# Patient Record
Sex: FEMALE | Race: WHITE | URBAN - METROPOLITAN AREA
[De-identification: names, ages, dates, MRNs, and addresses within clinical notes are randomized per-mention and may not be internally consistent; named-entity substitution may affect disease eponyms.]

---

## 2023-12-13 ENCOUNTER — ANESTHESIA EVENT (OUTPATIENT)
Dept: SURGERY | Facility: HOSPITAL | Age: 48
End: 2023-12-13
Payer: COMMERCIAL

## 2023-12-13 ENCOUNTER — HOSPITAL ENCOUNTER (OUTPATIENT)
Facility: HOSPITAL | Age: 48
Setting detail: HOSPITAL OUTPATIENT SURGERY
Discharge: HOME | End: 2023-12-13
Attending: OTOLARYNGOLOGY | Admitting: OTOLARYNGOLOGY
Payer: COMMERCIAL

## 2023-12-13 ENCOUNTER — ANESTHESIA (OUTPATIENT)
Dept: SURGERY | Facility: HOSPITAL | Age: 48
End: 2023-12-13
Payer: COMMERCIAL

## 2023-12-13 VITALS
BODY MASS INDEX: 18.75 KG/M2 | SYSTOLIC BLOOD PRESSURE: 144 MMHG | WEIGHT: 109.81 LBS | OXYGEN SATURATION: 85 % | HEART RATE: 79 BPM | DIASTOLIC BLOOD PRESSURE: 89 MMHG | HEIGHT: 64 IN | TEMPERATURE: 97 F | RESPIRATION RATE: 18 BRPM

## 2023-12-13 LAB — B-HCG UR QL: NEGATIVE

## 2023-12-13 PROCEDURE — 88333 PATH CONSLTJ SURG CYTO XM 1: CPT | Performed by: OTOLARYNGOLOGY

## 2023-12-13 PROCEDURE — 88185 FLOWCYTOMETRY/TC ADD-ON: CPT | Performed by: OTOLARYNGOLOGY

## 2023-12-13 PROCEDURE — 88342 IMHCHEM/IMCYTCHM 1ST ANTB: CPT | Performed by: OTOLARYNGOLOGY

## 2023-12-13 PROCEDURE — 88184 FLOWCYTOMETRY/ TC 1 MARKER: CPT | Performed by: OTOLARYNGOLOGY

## 2023-12-13 PROCEDURE — 87206 SMEAR FLUORESCENT/ACID STAI: CPT | Performed by: ANESTHESIOLOGY

## 2023-12-13 PROCEDURE — 87116 MYCOBACTERIA CULTURE: CPT | Performed by: ANESTHESIOLOGY

## 2023-12-13 PROCEDURE — 87102 FUNGUS ISOLATION CULTURE: CPT | Performed by: ANESTHESIOLOGY

## 2023-12-13 PROCEDURE — 81025 URINE PREGNANCY TEST: CPT

## 2023-12-13 PROCEDURE — 88341 IMHCHEM/IMCYTCHM EA ADD ANTB: CPT | Performed by: OTOLARYNGOLOGY

## 2023-12-13 PROCEDURE — 88307 TISSUE EXAM BY PATHOLOGIST: CPT | Performed by: OTOLARYNGOLOGY

## 2023-12-13 PROCEDURE — 0CBM8ZX EXCISION OF PHARYNX, VIA NATURAL OR ARTIFICIAL OPENING ENDOSCOPIC, DIAGNOSTIC: ICD-10-PCS | Performed by: OTOLARYNGOLOGY

## 2023-12-13 RX ORDER — SCOLOPAMINE TRANSDERMAL SYSTEM 1 MG/1
1 PATCH, EXTENDED RELEASE TRANSDERMAL ONCE
Status: DISCONTINUED | OUTPATIENT
Start: 2023-12-13 | End: 2023-12-13 | Stop reason: HOSPADM

## 2023-12-13 RX ORDER — HYDROMORPHONE HYDROCHLORIDE 1 MG/ML
0.2 INJECTION, SOLUTION INTRAMUSCULAR; INTRAVENOUS; SUBCUTANEOUS EVERY 5 MIN PRN
Status: DISCONTINUED | OUTPATIENT
Start: 2023-12-13 | End: 2023-12-13

## 2023-12-13 RX ORDER — SODIUM CHLORIDE, SODIUM LACTATE, POTASSIUM CHLORIDE, CALCIUM CHLORIDE 600; 310; 30; 20 MG/100ML; MG/100ML; MG/100ML; MG/100ML
INJECTION, SOLUTION INTRAVENOUS CONTINUOUS
Status: DISCONTINUED | OUTPATIENT
Start: 2023-12-13 | End: 2023-12-13

## 2023-12-13 RX ORDER — ACETAMINOPHEN 500 MG
1000 TABLET ORAL ONCE AS NEEDED
Status: DISCONTINUED | OUTPATIENT
Start: 2023-12-13 | End: 2023-12-13

## 2023-12-13 RX ORDER — LABETALOL HYDROCHLORIDE 5 MG/ML
5 INJECTION, SOLUTION INTRAVENOUS EVERY 5 MIN PRN
Status: DISCONTINUED | OUTPATIENT
Start: 2023-12-13 | End: 2023-12-13

## 2023-12-13 RX ORDER — LIDOCAINE HYDROCHLORIDE 10 MG/ML
INJECTION, SOLUTION EPIDURAL; INFILTRATION; INTRACAUDAL; PERINEURAL AS NEEDED
Status: DISCONTINUED | OUTPATIENT
Start: 2023-12-13 | End: 2023-12-13 | Stop reason: SURG

## 2023-12-13 RX ORDER — ALBUTEROL SULFATE 2.5 MG/3ML
2.5 SOLUTION RESPIRATORY (INHALATION) AS NEEDED
Status: DISCONTINUED | OUTPATIENT
Start: 2023-12-13 | End: 2023-12-13

## 2023-12-13 RX ORDER — HYDROMORPHONE HYDROCHLORIDE 1 MG/ML
0.6 INJECTION, SOLUTION INTRAMUSCULAR; INTRAVENOUS; SUBCUTANEOUS EVERY 5 MIN PRN
Status: DISCONTINUED | OUTPATIENT
Start: 2023-12-13 | End: 2023-12-13

## 2023-12-13 RX ORDER — HYDROMORPHONE HYDROCHLORIDE 1 MG/ML
0.4 INJECTION, SOLUTION INTRAMUSCULAR; INTRAVENOUS; SUBCUTANEOUS EVERY 5 MIN PRN
Status: DISCONTINUED | OUTPATIENT
Start: 2023-12-13 | End: 2023-12-13

## 2023-12-13 RX ORDER — ONDANSETRON 2 MG/ML
4 INJECTION INTRAMUSCULAR; INTRAVENOUS EVERY 6 HOURS PRN
Status: DISCONTINUED | OUTPATIENT
Start: 2023-12-13 | End: 2023-12-13

## 2023-12-13 RX ORDER — HYDROCODONE BITARTRATE AND ACETAMINOPHEN 5; 325 MG/1; MG/1
1 TABLET ORAL ONCE AS NEEDED
Status: DISCONTINUED | OUTPATIENT
Start: 2023-12-13 | End: 2023-12-13

## 2023-12-13 RX ORDER — NALOXONE HYDROCHLORIDE 0.4 MG/ML
0.08 INJECTION, SOLUTION INTRAMUSCULAR; INTRAVENOUS; SUBCUTANEOUS AS NEEDED
Status: DISCONTINUED | OUTPATIENT
Start: 2023-12-13 | End: 2023-12-13

## 2023-12-13 RX ORDER — HYDROCODONE BITARTRATE AND ACETAMINOPHEN 5; 325 MG/1; MG/1
2 TABLET ORAL ONCE AS NEEDED
Status: DISCONTINUED | OUTPATIENT
Start: 2023-12-13 | End: 2023-12-13

## 2023-12-13 RX ORDER — HYDROMORPHONE HYDROCHLORIDE 1 MG/ML
INJECTION, SOLUTION INTRAMUSCULAR; INTRAVENOUS; SUBCUTANEOUS
Status: COMPLETED
Start: 2023-12-13 | End: 2023-12-13

## 2023-12-13 RX ORDER — ROCURONIUM BROMIDE 10 MG/ML
INJECTION, SOLUTION INTRAVENOUS AS NEEDED
Status: DISCONTINUED | OUTPATIENT
Start: 2023-12-13 | End: 2023-12-13 | Stop reason: SURG

## 2023-12-13 RX ORDER — ONDANSETRON 2 MG/ML
INJECTION INTRAMUSCULAR; INTRAVENOUS AS NEEDED
Status: DISCONTINUED | OUTPATIENT
Start: 2023-12-13 | End: 2023-12-13 | Stop reason: SURG

## 2023-12-13 RX ORDER — DEXAMETHASONE SODIUM PHOSPHATE 4 MG/ML
VIAL (ML) INJECTION AS NEEDED
Status: DISCONTINUED | OUTPATIENT
Start: 2023-12-13 | End: 2023-12-13 | Stop reason: SURG

## 2023-12-13 RX ORDER — ACETAMINOPHEN 500 MG
1000 TABLET ORAL ONCE
Status: DISCONTINUED | OUTPATIENT
Start: 2023-12-13 | End: 2023-12-13 | Stop reason: HOSPADM

## 2023-12-13 RX ORDER — INSULIN ASPART 100 [IU]/ML
INJECTION, SOLUTION INTRAVENOUS; SUBCUTANEOUS ONCE
Status: DISCONTINUED | OUTPATIENT
Start: 2023-12-13 | End: 2023-12-13

## 2023-12-13 RX ADMIN — LIDOCAINE HYDROCHLORIDE 50 MG: 10 INJECTION, SOLUTION EPIDURAL; INFILTRATION; INTRACAUDAL; PERINEURAL at 11:46:00

## 2023-12-13 RX ADMIN — ONDANSETRON 4 MG: 2 INJECTION INTRAMUSCULAR; INTRAVENOUS at 11:50:00

## 2023-12-13 RX ADMIN — ROCURONIUM BROMIDE 30 MG: 10 INJECTION, SOLUTION INTRAVENOUS at 11:46:00

## 2023-12-13 RX ADMIN — DEXAMETHASONE SODIUM PHOSPHATE 8 MG: 4 MG/ML VIAL (ML) INJECTION at 11:50:00

## 2023-12-13 RX ADMIN — SODIUM CHLORIDE, SODIUM LACTATE, POTASSIUM CHLORIDE, CALCIUM CHLORIDE: 600; 310; 30; 20 INJECTION, SOLUTION INTRAVENOUS at 12:04:00

## 2023-12-13 NOTE — OPERATIVE REPORT
[facility]    PATIENT'S NAME: Tanya Adam   ATTENDING PHYSICIAN: Deepti Vyas M.D. OPERATING PHYSICIAN: Deepti Vyas M.D. PATIENT ACCOUNT#:   [de-identified]    LOCATION:  [facility] 14 EDWP 10  MEDICAL RECORD #:   [id number]       YOB: 1975  ADMISSION DATE:       12/13/2023      OPERATION DATE:  12/13/2023    OPERATIVE REPORT      PREOPERATIVE DIAGNOSIS:  Left base of tongue lesion. POSTOPERATIVE DIAGNOSIS:  Left base of tongue lesion. PROCEDURE:  Direct laryngoscopy and biopsy of left base of tongue lesion. ANESTHESIA:  General.    COMPLICATIONS:  None. DISPOSITION:  To Recovery in stable condition. INDICATIONS:  This is a 78-year-old who has had persistent globus sensation. Imaging showing fullness in the left base of tongue, and scope was done showing hypertrophy of left lingual tonsil. The patient was recommended to undergo biopsy. Understanding the risks and benefits including bleeding, infection, hoarseness, possibility for persistent globus, the patient signed the consent form. OPERATIVE TECHNIQUE:  Patient brought to the operating room, placed in supine position, given general anesthetic. Prior to intubation, flexible laryngoscopy was performed with flexible endoscope passed transnasally into the hypopharynx showing fullness in the left base of tongue. The patient then did undergo intubation. The table was turned 90 degrees. A dental guard was placed. The Sammarinese laryngoscope was brought into the field and inspection of the base of the tongue revealed a cystic firm lesion. This was biopsied with cup forceps and sent fresh. Prior to the biopsy, direct palpation with a finger felt it to be soft to firm. The patient tolerated the procedure very well. There was minimal bleeding. He was awoken from anesthetic, brought to Recovery in stable condition.     Dictated By Deepti Vyas M.D.  d: 12/13/2023 12:18:57  t: 12/13/2023 13:44:58  [facility] [id number]  JJD/

## 2023-12-13 NOTE — ANESTHESIA POSTPROCEDURE EVALUATION
[facility] 7715 Patient Status:  Hospital Outpatient Surgery   Age/Gender 46year old female MRN [id number]   [facility] SURGERY Attending Chris Norwood MD   Hosp Day # 0 PCP Ale Raymond MD       Anesthesia Post-op Note    flexible fibreoptic laryngoscopy DIRECT LARYNGOSCOPY WITH BIOPSY OF LEFT BASE OF TONGUE    Procedure Summary       Date: 12/13/23 Room / Location: [facility]4 [facility] OR 02 / 1404 [facility] OR    Anesthesia Start: 9210 Anesthesia Stop: 9203    Procedure: flexible fibreoptic laryngoscopy DIRECT LARYNGOSCOPY WITH BIOPSY OF LEFT BASE OF TONGUE (Left: Mouth) Diagnosis: (ENLARGEMENT OF LEFT PALATINE TONSIL; GLOBUS PHARYNGEUS; THROAT PAIN)    Surgeons: Chris Norwood MD Anesthesiologist: Yahaira Padilla MD    Anesthesia Type: general ASA Status: 1            Anesthesia Type: general    Vitals Value Taken Time   /79 12/13/23 1209   Temp 98.1 12/13/23 1209   Pulse 108 12/13/23 1208   Resp 18 12/13/23 1209   SpO2 100 % 12/13/23 1208   Vitals shown include unfiled device data. Patient Location: PACU    Anesthesia Type: general    Airway Patency: patent and extubated    Postop Pain Control: adequate    Nausea/Vomiting: none    Cardiopulmonary/Hydration status: stable euvolemic    Complications: no apparent anesthesia related complications    Postop vital signs: stable    Dental Exam: Unchanged from Preop    Patient to be discharged from PACU when criteria met.

## 2023-12-13 NOTE — ANESTHESIA PROCEDURE NOTES
Airway  Date/Time: 12/13/2023 11:48 AM  Urgency: elective      General Information and Staff    Patient location during procedure: OR  Anesthesiologist: Dianne Howard MD  Performed: anesthesiologist   Performed by: Dianne Howard MD  Authorized by: Dianne Howard MD      Indications and Patient Condition  Indications for airway management: anesthesia  Sedation level: deep  Preoxygenated: yes  Patient position: sniffing  Mask difficulty assessment: 1 - vent by mask    Final Airway Details  Final airway type: endotracheal airway      Successful airway: ETT  Cuffed: yes   Successful intubation technique: direct laryngoscopy  Endotracheal tube insertion site: oral  Blade: Giancarlo  Blade size: #3  ETT size (mm): 6.0    Cormack-Lehane Classification: grade I - full view of glottis  Placement verified by: capnometry   Measured from: lips  ETT to lips (cm): 21  Number of attempts at approach: 1

## 2023-12-13 NOTE — INTERVAL H&P NOTE
Pre-op Diagnosis: ENLARGEMENT OF LEFT PALATINE TONSIL; GLOBUS PHARYNGEUS; THROAT PAIN    The above referenced H&P was reviewed by Gris Murphy MD on 12/13/2023, the patient was examined and no significant changes have occurred in the patient's condition since the H&P was performed. I discussed with the patient and/or legal representative the potential benefits, risks and side effects of this procedure; the likelihood of the patient achieving goals; and potential problems that might occur during recuperation. I discussed reasonable alternatives to the procedure, including risks, benefits and side effects related to the alternatives and risks related to not receiving this procedure. We will proceed with procedure as planned.

## 2023-12-13 NOTE — BRIEF OP NOTE
Pre-Operative Diagnosis: ENLARGEMENT OF LEFT PALATINE TONSIL; GLOBUS PHARYNGEUS; THROAT PAIN     Post-Operative Diagnosis: ENLARGEMENT OF LEFT PALATINE TONSIL; GLOBUS PHARYNGEUS; THROAT PAIN      Procedure Performed:   flexible fibreoptic laryngoscopy DIRECT LARYNGOSCOPY WITH BIOPSY OF LEFT BASE OF TONGUE    Surgeon(s) and Role:     Juliette Braxton MD - Primary    Assistant(s):        Surgical Findings: right bot cystic fullness      Specimen: to path      Estimated Blood Loss: Blood Output: 5 mL (12/13/2023 12:01 PM)      Dictation Number:      Latoya Phillip MD  12/13/2023  12:17 PM

## 2023-12-13 NOTE — BRIEF OP NOTE
Pre-Operative Diagnosis: ENLARGEMENT OF LEFT PALATINE TONSIL; GLOBUS PHARYNGEUS; THROAT PAIN     Post-Operative Diagnosis: ENLARGEMENT OF LEFT PALATINE TONSIL; GLOBUS PHARYNGEUS; THROAT PAIN      Procedure Performed:   flexible fibreoptic laryngoscopy DIRECT LARYNGOSCOPY WITH BIOPSY OF LEFT BASE OF TONGUE    Surgeon(s) and Role:     Kory Freed MD - Primary    Assistant(s):        Surgical Findings: left tongue mass      Specimen: to path      Estimated Blood Loss: Blood Output: 5 mL (12/13/2023 12:01 PM)      Dictation Number:      Tarah Dempsey MD  12/13/2023  12:15 PM

## 2023-12-20 LAB
CD10 CELLS NFR SPEC: <1 %
CD10/CD19: <1 %
CD19 CELLS NFR SPEC: 41 %
CD19+/CD200+: 17 %
CD2 CELLS NFR SPEC: 57 %
CD20 CELLS NFR SPEC: 40 %
CD200 CELLS: 36 %
CD3 CELLS NFR SPEC: 56 %
CD3+/TCRGD+: <1 %
CD3+CD4+ CELLS NFR SPEC: 50 %
CD3+CD4+ CELLS/CD3+CD8+ CLL SPEC: 8.3
CD3+CD8+ CELLS NFR SPEC: 6 %
CD3-/CD56+: <1 %
CD34 CELLS NFR SPEC: <1 %
CD38 CELLS NFR SPEC: 7 %
CD38+/CD19+: 4 %
CD45 CELLS NFR SPEC: 100 %
CD5 CELLS NFR SPEC: 59 %
CD5/CD19 CELLS: 2 %
CD7 CELLS NFR SPEC: 54 %
CELL SURF KAPPA/LAMBDA RATIO: 1.7
CELL SURF LAMBDA LIGHT CHAIN: 15 %
CELL SURFACE KAPPA LIGHT CHAIN: 26 %
TCR G-D CELLS NFR SPEC: <1 %

## (undated) DEVICE — STERILE SYNTHETIC POLYISOPRENE POWDER-FREE SURGICAL GLOVES WITH HYDROGEL COATING, SMOOTH FINISH, STRAIGHT FINGER: Brand: PROTEXIS

## (undated) DEVICE — SLEEVE COMPR M KNEE LEN SGL USE KENDALL SCD

## (undated) DEVICE — LARYNGOSCOPY: Brand: MEDLINE INDUSTRIES, INC.

## (undated) DEVICE — MICROLARYNGEAL ORAL/NASAL TRACHEAL TUBE CUFFED,MURPHY EYE: Brand: SHILEY